# Patient Record
Sex: FEMALE | Race: WHITE | NOT HISPANIC OR LATINO | URBAN - METROPOLITAN AREA
[De-identification: names, ages, dates, MRNs, and addresses within clinical notes are randomized per-mention and may not be internally consistent; named-entity substitution may affect disease eponyms.]

---

## 2018-02-01 ENCOUNTER — INPATIENT (INPATIENT)
Facility: HOSPITAL | Age: 83
LOS: 0 days | Discharge: HOME | DRG: 194 | End: 2018-02-02
Attending: INTERNAL MEDICINE | Admitting: INTERNAL MEDICINE
Payer: MEDICARE

## 2018-02-01 VITALS
HEART RATE: 87 BPM | TEMPERATURE: 98 F | OXYGEN SATURATION: 93 % | DIASTOLIC BLOOD PRESSURE: 66 MMHG | SYSTOLIC BLOOD PRESSURE: 145 MMHG | RESPIRATION RATE: 18 BRPM

## 2018-02-01 LAB
ALBUMIN SERPL ELPH-MCNC: 4 G/DL — SIGNIFICANT CHANGE UP (ref 3.3–5)
ALP SERPL-CCNC: 62 U/L — SIGNIFICANT CHANGE UP (ref 40–120)
ALT FLD-CCNC: 19 U/L — SIGNIFICANT CHANGE UP (ref 10–45)
ANION GAP SERPL CALC-SCNC: 16 MMOL/L — SIGNIFICANT CHANGE UP (ref 5–17)
APTT BLD: 21 SEC — LOW (ref 27.5–37.4)
AST SERPL-CCNC: 22 U/L — SIGNIFICANT CHANGE UP (ref 10–40)
BASOPHILS NFR BLD AUTO: 0.3 % — SIGNIFICANT CHANGE UP (ref 0–2)
BILIRUB SERPL-MCNC: 0.3 MG/DL — SIGNIFICANT CHANGE UP (ref 0.2–1.2)
BUN SERPL-MCNC: 28 MG/DL — HIGH (ref 7–23)
CALCIUM SERPL-MCNC: 9.3 MG/DL — SIGNIFICANT CHANGE UP (ref 8.4–10.5)
CHLORIDE SERPL-SCNC: 98 MMOL/L — SIGNIFICANT CHANGE UP (ref 96–108)
CK MB CFR SERPL CALC: 2.1 NG/ML — SIGNIFICANT CHANGE UP (ref 0–6.7)
CK SERPL-CCNC: 193 U/L — HIGH (ref 25–170)
CO2 SERPL-SCNC: 22 MMOL/L — SIGNIFICANT CHANGE UP (ref 22–31)
CREAT SERPL-MCNC: 1 MG/DL — SIGNIFICANT CHANGE UP (ref 0.5–1.3)
FLUAV H1 2009 PAND RNA SPEC QL NAA+PROBE: DETECTED
GLUCOSE SERPL-MCNC: 165 MG/DL — HIGH (ref 70–99)
HCT VFR BLD CALC: 33.3 % — LOW (ref 34.5–45)
HGB BLD-MCNC: 10.8 G/DL — LOW (ref 11.5–15.5)
INR BLD: 1.11 — SIGNIFICANT CHANGE UP (ref 0.88–1.16)
LACTATE SERPL-SCNC: 3.2 MMOL/L — HIGH (ref 0.5–2)
LACTATE SERPL-SCNC: 4.1 MMOL/L — CRITICAL HIGH (ref 0.5–2)
LACTATE SERPL-SCNC: 4.9 MMOL/L — CRITICAL HIGH (ref 0.5–2)
LYMPHOCYTES # BLD AUTO: 30 % — SIGNIFICANT CHANGE UP (ref 13–44)
MCHC RBC-ENTMCNC: 29.6 PG — SIGNIFICANT CHANGE UP (ref 27–34)
MCHC RBC-ENTMCNC: 32.4 G/DL — SIGNIFICANT CHANGE UP (ref 32–36)
MCV RBC AUTO: 91.2 FL — SIGNIFICANT CHANGE UP (ref 80–100)
MONOCYTES NFR BLD AUTO: 5.1 % — SIGNIFICANT CHANGE UP (ref 2–14)
NEUTROPHILS NFR BLD AUTO: 64.6 % — SIGNIFICANT CHANGE UP (ref 43–77)
PLATELET # BLD AUTO: 168 K/UL — SIGNIFICANT CHANGE UP (ref 150–400)
POTASSIUM SERPL-MCNC: 4.8 MMOL/L — SIGNIFICANT CHANGE UP (ref 3.5–5.3)
POTASSIUM SERPL-SCNC: 4.8 MMOL/L — SIGNIFICANT CHANGE UP (ref 3.5–5.3)
PROT SERPL-MCNC: 6.5 G/DL — SIGNIFICANT CHANGE UP (ref 6–8.3)
PROTHROM AB SERPL-ACNC: 12.4 SEC — SIGNIFICANT CHANGE UP (ref 9.8–12.7)
RAPID RVP RESULT: DETECTED
RBC # BLD: 3.65 M/UL — LOW (ref 3.8–5.2)
RBC # FLD: 14 % — SIGNIFICANT CHANGE UP (ref 10.3–16.9)
SODIUM SERPL-SCNC: 136 MMOL/L — SIGNIFICANT CHANGE UP (ref 135–145)
TROPONIN T SERPL-MCNC: <0.01 NG/ML — SIGNIFICANT CHANGE UP (ref 0–0.01)
WBC # BLD: 13.4 K/UL — HIGH (ref 3.8–10.5)
WBC # FLD AUTO: 13.4 K/UL — HIGH (ref 3.8–10.5)

## 2018-02-01 PROCEDURE — 93010 ELECTROCARDIOGRAM REPORT: CPT

## 2018-02-01 PROCEDURE — 71045 X-RAY EXAM CHEST 1 VIEW: CPT | Mod: 26

## 2018-02-01 PROCEDURE — 99285 EMERGENCY DEPT VISIT HI MDM: CPT | Mod: 25

## 2018-02-01 PROCEDURE — 71275 CT ANGIOGRAPHY CHEST: CPT | Mod: 26

## 2018-02-01 RX ORDER — SODIUM CHLORIDE 9 MG/ML
1000 INJECTION INTRAMUSCULAR; INTRAVENOUS; SUBCUTANEOUS
Qty: 0 | Refills: 0 | Status: DISCONTINUED | OUTPATIENT
Start: 2018-02-01 | End: 2018-02-02

## 2018-02-01 RX ORDER — INSULIN LISPRO 100/ML
VIAL (ML) SUBCUTANEOUS
Qty: 0 | Refills: 0 | Status: DISCONTINUED | OUTPATIENT
Start: 2018-02-01 | End: 2018-02-02

## 2018-02-01 RX ORDER — SODIUM CHLORIDE 9 MG/ML
1000 INJECTION INTRAMUSCULAR; INTRAVENOUS; SUBCUTANEOUS ONCE
Qty: 0 | Refills: 0 | Status: COMPLETED | OUTPATIENT
Start: 2018-02-01 | End: 2018-02-01

## 2018-02-01 RX ORDER — DEXTROSE 50 % IN WATER 50 %
1 SYRINGE (ML) INTRAVENOUS ONCE
Qty: 0 | Refills: 0 | Status: DISCONTINUED | OUTPATIENT
Start: 2018-02-01 | End: 2018-02-02

## 2018-02-01 RX ORDER — DEXTROSE 50 % IN WATER 50 %
25 SYRINGE (ML) INTRAVENOUS ONCE
Qty: 0 | Refills: 0 | Status: DISCONTINUED | OUTPATIENT
Start: 2018-02-01 | End: 2018-02-02

## 2018-02-01 RX ORDER — IPRATROPIUM/ALBUTEROL SULFATE 18-103MCG
3 AEROSOL WITH ADAPTER (GRAM) INHALATION ONCE
Qty: 0 | Refills: 0 | Status: COMPLETED | OUTPATIENT
Start: 2018-02-01 | End: 2018-02-01

## 2018-02-01 RX ORDER — SODIUM CHLORIDE 9 MG/ML
1000 INJECTION, SOLUTION INTRAVENOUS
Qty: 0 | Refills: 0 | Status: DISCONTINUED | OUTPATIENT
Start: 2018-02-01 | End: 2018-02-02

## 2018-02-01 RX ORDER — IPRATROPIUM/ALBUTEROL SULFATE 18-103MCG
3 AEROSOL WITH ADAPTER (GRAM) INHALATION EVERY 4 HOURS
Qty: 0 | Refills: 0 | Status: DISCONTINUED | OUTPATIENT
Start: 2018-02-01 | End: 2018-02-02

## 2018-02-01 RX ORDER — GLUCAGON INJECTION, SOLUTION 0.5 MG/.1ML
1 INJECTION, SOLUTION SUBCUTANEOUS ONCE
Qty: 0 | Refills: 0 | Status: DISCONTINUED | OUTPATIENT
Start: 2018-02-01 | End: 2018-02-02

## 2018-02-01 RX ORDER — METFORMIN HYDROCHLORIDE 850 MG/1
1 TABLET ORAL
Qty: 0 | Refills: 0 | COMMUNITY

## 2018-02-01 RX ORDER — DEXTROSE 50 % IN WATER 50 %
12.5 SYRINGE (ML) INTRAVENOUS ONCE
Qty: 0 | Refills: 0 | Status: DISCONTINUED | OUTPATIENT
Start: 2018-02-01 | End: 2018-02-02

## 2018-02-01 RX ORDER — ONDANSETRON 8 MG/1
4 TABLET, FILM COATED ORAL ONCE
Qty: 0 | Refills: 0 | Status: COMPLETED | OUTPATIENT
Start: 2018-02-01 | End: 2018-02-01

## 2018-02-01 RX ADMIN — Medication 30 MILLIGRAM(S): at 19:17

## 2018-02-01 RX ADMIN — ONDANSETRON 4 MILLIGRAM(S): 8 TABLET, FILM COATED ORAL at 21:58

## 2018-02-01 RX ADMIN — Medication 3 MILLILITER(S): at 18:34

## 2018-02-01 RX ADMIN — SODIUM CHLORIDE 1000 MILLILITER(S): 9 INJECTION INTRAMUSCULAR; INTRAVENOUS; SUBCUTANEOUS at 21:57

## 2018-02-01 RX ADMIN — Medication 3 MILLILITER(S): at 23:23

## 2018-02-01 RX ADMIN — Medication 125 MILLIGRAM(S): at 18:33

## 2018-02-01 RX ADMIN — SODIUM CHLORIDE 1000 MILLILITER(S): 9 INJECTION INTRAMUSCULAR; INTRAVENOUS; SUBCUTANEOUS at 17:41

## 2018-02-01 RX ADMIN — SODIUM CHLORIDE 150 MILLILITER(S): 9 INJECTION INTRAMUSCULAR; INTRAVENOUS; SUBCUTANEOUS at 19:17

## 2018-02-01 RX ADMIN — Medication 3 MILLILITER(S): at 18:15

## 2018-02-01 RX ADMIN — SODIUM CHLORIDE 1000 MILLILITER(S): 9 INJECTION INTRAMUSCULAR; INTRAVENOUS; SUBCUTANEOUS at 18:54

## 2018-02-01 NOTE — ED PROVIDER NOTE - PROGRESS NOTE DETAILS
Neurosurgery to see and eval pt -  await CT noted lactate elevation  pt well appearing normotensive and ambulatory  will repeat  --does not correlate clinically  --no signs of DKA or N/V or diarrhea

## 2018-02-01 NOTE — ED PROVIDER NOTE - OBJECTIVE STATEMENT
83 yo 83 yo female with hx of DM HTN  bibems after brief syncopal event in a restaurant 1 hr ago  no head trauma  no abd pain no n/V  has had cough and wheezing over the past 2 days with flu like sx-- no leg swelling or calf tenderness -non smoker  no prior syncope-  no recent travel - stress test in the past 5 years was negative 81 yo female with hx of DM HTN  bibems after brief syncopal event in a restaurant 1 hr ago  no head trauma  no abd pain no n/V  has had cough and wheezing over the past 2 days with flu like sx-- no leg swelling or calf tenderness -non smoker  no prior syncope-  no [place] sob- no recent travel - stress test in the past 5 years was negative 81 yo female with hx of CLL dx 30 years ago- hx of DM on metformin- hx of HTN  bibems after brief syncopal event in a restaurant 1 hr ago  no head trauma  no abd pain no n/V  has had cough and wheezing over the past 2 days with flu like sx-- no leg swelling or calf tenderness -non smoker  no prior syncope-  no [place] sob- no recent travel - stress test in the past 2 years was negative

## 2018-02-01 NOTE — ED ADULT NURSE NOTE - OBJECTIVE STATEMENT
81 y/o female  BIBEMS s/p syncope at a restaurant. Pt states " I haven't been feeling well, I have a cough, chills and fever for the past 3 days, I haven't eaten much, so today I decided to go out and eat something and then I think I fainted, that's all I can remember".  on route EMS stated a IV line, IVF and gave pt 4 mg of Zofran d/u nausea 81 y/o female  BIBEMS s/p syncope at a restaurant. Pt states " I haven't been feeling well, I have a cough, chills and fever for the past 3 days, I haven't eaten much, so today I decided to go out and eat something and then I think I fainted, that's all I can remember".  on route EMS stated a IV line, IVF and gave pt 4 mg of Zofran d/u nausea.  Pt denies any pain at present time, EKG done, FS checked and blood sent to lab, pt felt warm to touched, temp rechecked rectally, MD Horan made aware of findings.

## 2018-02-01 NOTE — ED PROVIDER NOTE - MEDICAL DECISION MAKING DETAILS
81 yo F with syncope-- brief in setting of feeling ill cough flu like sx  no antecedant CP  no N/V or diarrhea 83 yo F with syncope-- brief in setting of feeling ill cough flu like sx  no antecedant CP-  no N/V or diarrhea  pos f;u  pos cough and wheezing - still with slight wheezing cough clinically stable 81 yo F with syncope-- brief in setting of feeling ill cough flu like sx  no antecedant CP-  no N/V or diarrhea  pos f/u  pos cough and wheezing - still with slight wheezing cough clinically stable- 83 yo F with syncope-- brief in setting of feeling ill cough flu like sx  no antecedant CP- no prior cardiac hx- no hx of CAD- likely from mild volume depletion   no N/V or diarrhea  pos influenza  pos cough and wheezing - still with slight wheezing cough clinically stable-

## 2018-02-01 NOTE — ED ADULT TRIAGE NOTE - CHIEF COMPLAINT QUOTE
Patient BIBA after syncopal episode while eating a restaurant. Patient endorses that she has been dealing with Flu-like symptoms. Given fluids and Zofran in the field with alleviation of s/s.

## 2018-02-02 VITALS
HEART RATE: 66 BPM | RESPIRATION RATE: 18 BRPM | OXYGEN SATURATION: 94 % | SYSTOLIC BLOOD PRESSURE: 147 MMHG | DIASTOLIC BLOOD PRESSURE: 67 MMHG | TEMPERATURE: 98 F

## 2018-02-02 DIAGNOSIS — R55 SYNCOPE AND COLLAPSE: ICD-10-CM

## 2018-02-02 DIAGNOSIS — C91.10 CHRONIC LYMPHOCYTIC LEUKEMIA OF B-CELL TYPE NOT HAVING ACHIEVED REMISSION: ICD-10-CM

## 2018-02-02 DIAGNOSIS — Z29.9 ENCOUNTER FOR PROPHYLACTIC MEASURES, UNSPECIFIED: ICD-10-CM

## 2018-02-02 DIAGNOSIS — E11.9 TYPE 2 DIABETES MELLITUS WITHOUT COMPLICATIONS: ICD-10-CM

## 2018-02-02 DIAGNOSIS — Z98.890 OTHER SPECIFIED POSTPROCEDURAL STATES: Chronic | ICD-10-CM

## 2018-02-02 DIAGNOSIS — R63.8 OTHER SYMPTOMS AND SIGNS CONCERNING FOOD AND FLUID INTAKE: ICD-10-CM

## 2018-02-02 DIAGNOSIS — E87.2 ACIDOSIS: ICD-10-CM

## 2018-02-02 DIAGNOSIS — J45.901 UNSPECIFIED ASTHMA WITH (ACUTE) EXACERBATION: ICD-10-CM

## 2018-02-02 DIAGNOSIS — R91.8 OTHER NONSPECIFIC ABNORMAL FINDING OF LUNG FIELD: ICD-10-CM

## 2018-02-02 DIAGNOSIS — I10 ESSENTIAL (PRIMARY) HYPERTENSION: ICD-10-CM

## 2018-02-02 DIAGNOSIS — Z98.49 CATARACT EXTRACTION STATUS, UNSPECIFIED EYE: Chronic | ICD-10-CM

## 2018-02-02 DIAGNOSIS — J10.1 INFLUENZA DUE TO OTHER IDENTIFIED INFLUENZA VIRUS WITH OTHER RESPIRATORY MANIFESTATIONS: ICD-10-CM

## 2018-02-02 LAB
ANION GAP SERPL CALC-SCNC: 17 MMOL/L — SIGNIFICANT CHANGE UP (ref 5–17)
BUN SERPL-MCNC: 22 MG/DL — SIGNIFICANT CHANGE UP (ref 7–23)
CALCIUM SERPL-MCNC: 8.5 MG/DL — SIGNIFICANT CHANGE UP (ref 8.4–10.5)
CHLORIDE SERPL-SCNC: 100 MMOL/L — SIGNIFICANT CHANGE UP (ref 96–108)
CO2 SERPL-SCNC: 19 MMOL/L — LOW (ref 22–31)
CREAT SERPL-MCNC: 0.93 MG/DL — SIGNIFICANT CHANGE UP (ref 0.5–1.3)
GLUCOSE BLDC GLUCOMTR-MCNC: 154 MG/DL — HIGH (ref 70–99)
GLUCOSE BLDC GLUCOMTR-MCNC: 200 MG/DL — HIGH (ref 70–99)
GLUCOSE BLDC GLUCOMTR-MCNC: 206 MG/DL — HIGH (ref 70–99)
GLUCOSE BLDC GLUCOMTR-MCNC: 233 MG/DL — HIGH (ref 70–99)
GLUCOSE SERPL-MCNC: 210 MG/DL — HIGH (ref 70–99)
HBA1C BLD-MCNC: 6.4 % — HIGH (ref 4–5.6)
HCT VFR BLD CALC: 31.8 % — LOW (ref 34.5–45)
HGB BLD-MCNC: 10.2 G/DL — LOW (ref 11.5–15.5)
LACTATE SERPL-SCNC: 2.8 MMOL/L — HIGH (ref 0.5–2)
LACTATE SERPL-SCNC: 3.6 MMOL/L — HIGH (ref 0.5–2)
MAGNESIUM SERPL-MCNC: 1.8 MG/DL — SIGNIFICANT CHANGE UP (ref 1.6–2.6)
MCHC RBC-ENTMCNC: 29.5 PG — SIGNIFICANT CHANGE UP (ref 27–34)
MCHC RBC-ENTMCNC: 32.1 G/DL — SIGNIFICANT CHANGE UP (ref 32–36)
MCV RBC AUTO: 91.9 FL — SIGNIFICANT CHANGE UP (ref 80–100)
PLATELET # BLD AUTO: 166 K/UL — SIGNIFICANT CHANGE UP (ref 150–400)
POTASSIUM SERPL-MCNC: 4.4 MMOL/L — SIGNIFICANT CHANGE UP (ref 3.5–5.3)
POTASSIUM SERPL-SCNC: 4.4 MMOL/L — SIGNIFICANT CHANGE UP (ref 3.5–5.3)
RBC # BLD: 3.46 M/UL — LOW (ref 3.8–5.2)
RBC # FLD: 14.3 % — SIGNIFICANT CHANGE UP (ref 10.3–16.9)
SODIUM SERPL-SCNC: 136 MMOL/L — SIGNIFICANT CHANGE UP (ref 135–145)
WBC # BLD: 12.2 K/UL — HIGH (ref 3.8–10.5)
WBC # FLD AUTO: 12.2 K/UL — HIGH (ref 3.8–10.5)

## 2018-02-02 PROCEDURE — 93005 ELECTROCARDIOGRAM TRACING: CPT

## 2018-02-02 PROCEDURE — 71275 CT ANGIOGRAPHY CHEST: CPT

## 2018-02-02 PROCEDURE — 80048 BASIC METABOLIC PNL TOTAL CA: CPT

## 2018-02-02 PROCEDURE — 71045 X-RAY EXAM CHEST 1 VIEW: CPT

## 2018-02-02 PROCEDURE — 71045 X-RAY EXAM CHEST 1 VIEW: CPT | Mod: 26

## 2018-02-02 PROCEDURE — 80053 COMPREHEN METABOLIC PANEL: CPT

## 2018-02-02 PROCEDURE — 87633 RESP VIRUS 12-25 TARGETS: CPT

## 2018-02-02 PROCEDURE — 87486 CHLMYD PNEUM DNA AMP PROBE: CPT

## 2018-02-02 PROCEDURE — 84484 ASSAY OF TROPONIN QUANT: CPT

## 2018-02-02 PROCEDURE — 82550 ASSAY OF CK (CPK): CPT

## 2018-02-02 PROCEDURE — 85025 COMPLETE CBC W/AUTO DIFF WBC: CPT

## 2018-02-02 PROCEDURE — 85610 PROTHROMBIN TIME: CPT

## 2018-02-02 PROCEDURE — 94640 AIRWAY INHALATION TREATMENT: CPT

## 2018-02-02 PROCEDURE — 83605 ASSAY OF LACTIC ACID: CPT

## 2018-02-02 PROCEDURE — 94660 CPAP INITIATION&MGMT: CPT

## 2018-02-02 PROCEDURE — 83735 ASSAY OF MAGNESIUM: CPT

## 2018-02-02 PROCEDURE — 85027 COMPLETE CBC AUTOMATED: CPT

## 2018-02-02 PROCEDURE — 87798 DETECT AGENT NOS DNA AMP: CPT

## 2018-02-02 PROCEDURE — 82553 CREATINE MB FRACTION: CPT

## 2018-02-02 PROCEDURE — 85730 THROMBOPLASTIN TIME PARTIAL: CPT

## 2018-02-02 PROCEDURE — 83036 HEMOGLOBIN GLYCOSYLATED A1C: CPT

## 2018-02-02 PROCEDURE — 87581 M.PNEUMON DNA AMP PROBE: CPT

## 2018-02-02 PROCEDURE — 87040 BLOOD CULTURE FOR BACTERIA: CPT

## 2018-02-02 PROCEDURE — 96375 TX/PRO/DX INJ NEW DRUG ADDON: CPT

## 2018-02-02 PROCEDURE — 96374 THER/PROPH/DIAG INJ IV PUSH: CPT

## 2018-02-02 PROCEDURE — 36415 COLL VENOUS BLD VENIPUNCTURE: CPT

## 2018-02-02 PROCEDURE — 82962 GLUCOSE BLOOD TEST: CPT

## 2018-02-02 PROCEDURE — 99285 EMERGENCY DEPT VISIT HI MDM: CPT | Mod: 25

## 2018-02-02 RX ORDER — ASPIRIN/CALCIUM CARB/MAGNESIUM 324 MG
81 TABLET ORAL DAILY
Qty: 0 | Refills: 0 | Status: DISCONTINUED | OUTPATIENT
Start: 2018-02-02 | End: 2018-02-02

## 2018-02-02 RX ORDER — DEXTROSE 50 % IN WATER 50 %
1 SYRINGE (ML) INTRAVENOUS ONCE
Qty: 0 | Refills: 0 | Status: DISCONTINUED | OUTPATIENT
Start: 2018-02-02 | End: 2018-02-02

## 2018-02-02 RX ORDER — QUINAPRIL HYDROCHLORIDE 40 MG/1
1 TABLET, FILM COATED ORAL
Qty: 0 | Refills: 0 | COMMUNITY

## 2018-02-02 RX ORDER — ASPIRIN/CALCIUM CARB/MAGNESIUM 324 MG
0 TABLET ORAL
Qty: 0 | Refills: 0 | COMMUNITY

## 2018-02-02 RX ORDER — DEXTROSE 50 % IN WATER 50 %
25 SYRINGE (ML) INTRAVENOUS ONCE
Qty: 0 | Refills: 0 | Status: DISCONTINUED | OUTPATIENT
Start: 2018-02-02 | End: 2018-02-02

## 2018-02-02 RX ORDER — METFORMIN HYDROCHLORIDE 850 MG/1
2 TABLET ORAL
Qty: 0 | Refills: 0 | COMMUNITY

## 2018-02-02 RX ORDER — ALLOPURINOL 300 MG
1 TABLET ORAL
Qty: 0 | Refills: 0 | COMMUNITY

## 2018-02-02 RX ORDER — SODIUM CHLORIDE 9 MG/ML
1000 INJECTION INTRAMUSCULAR; INTRAVENOUS; SUBCUTANEOUS
Qty: 0 | Refills: 0 | Status: DISCONTINUED | OUTPATIENT
Start: 2018-02-02 | End: 2018-02-02

## 2018-02-02 RX ORDER — METFORMIN HYDROCHLORIDE 850 MG/1
1 TABLET ORAL
Qty: 0 | Refills: 0 | COMMUNITY

## 2018-02-02 RX ORDER — ACETAMINOPHEN 500 MG
650 TABLET ORAL EVERY 6 HOURS
Qty: 0 | Refills: 0 | Status: DISCONTINUED | OUTPATIENT
Start: 2018-02-02 | End: 2018-02-02

## 2018-02-02 RX ORDER — MAGNESIUM SULFATE 500 MG/ML
1 VIAL (ML) INJECTION ONCE
Qty: 0 | Refills: 0 | Status: COMPLETED | OUTPATIENT
Start: 2018-02-02 | End: 2018-02-02

## 2018-02-02 RX ORDER — IPRATROPIUM/ALBUTEROL SULFATE 18-103MCG
3 AEROSOL WITH ADAPTER (GRAM) INHALATION EVERY 4 HOURS
Qty: 0 | Refills: 0 | Status: DISCONTINUED | OUTPATIENT
Start: 2018-02-02 | End: 2018-02-02

## 2018-02-02 RX ORDER — GLUCAGON INJECTION, SOLUTION 0.5 MG/.1ML
1 INJECTION, SOLUTION SUBCUTANEOUS ONCE
Qty: 0 | Refills: 0 | Status: DISCONTINUED | OUTPATIENT
Start: 2018-02-02 | End: 2018-02-02

## 2018-02-02 RX ORDER — TRIAMTERENE/HYDROCHLOROTHIAZID 75 MG-50MG
1 TABLET ORAL
Qty: 0 | Refills: 0 | COMMUNITY

## 2018-02-02 RX ORDER — DEXTROSE 50 % IN WATER 50 %
12.5 SYRINGE (ML) INTRAVENOUS ONCE
Qty: 0 | Refills: 0 | Status: DISCONTINUED | OUTPATIENT
Start: 2018-02-02 | End: 2018-02-02

## 2018-02-02 RX ORDER — INSULIN LISPRO 100/ML
VIAL (ML) SUBCUTANEOUS
Qty: 0 | Refills: 0 | Status: DISCONTINUED | OUTPATIENT
Start: 2018-02-02 | End: 2018-02-02

## 2018-02-02 RX ORDER — ALLOPURINOL 300 MG
0 TABLET ORAL
Qty: 0 | Refills: 0 | COMMUNITY

## 2018-02-02 RX ORDER — SODIUM CHLORIDE 9 MG/ML
1000 INJECTION, SOLUTION INTRAVENOUS
Qty: 0 | Refills: 0 | Status: DISCONTINUED | OUTPATIENT
Start: 2018-02-02 | End: 2018-02-02

## 2018-02-02 RX ORDER — CIPROFLOXACIN LACTATE 400MG/40ML
1 VIAL (ML) INTRAVENOUS
Qty: 2 | Refills: 0 | OUTPATIENT
Start: 2018-02-02 | End: 2018-02-05

## 2018-02-02 RX ORDER — ALBUTEROL 90 UG/1
0 AEROSOL, METERED ORAL
Qty: 0 | Refills: 0 | COMMUNITY

## 2018-02-02 RX ADMIN — Medication 30 MILLIGRAM(S): at 17:20

## 2018-02-02 RX ADMIN — Medication 4: at 06:39

## 2018-02-02 RX ADMIN — Medication 100 GRAM(S): at 09:08

## 2018-02-02 RX ADMIN — Medication 2: at 17:16

## 2018-02-02 RX ADMIN — Medication 4: at 12:02

## 2018-02-02 RX ADMIN — Medication 3 MILLILITER(S): at 02:24

## 2018-02-02 RX ADMIN — Medication 81 MILLIGRAM(S): at 12:02

## 2018-02-02 RX ADMIN — Medication 3 MILLILITER(S): at 04:48

## 2018-02-02 RX ADMIN — Medication 2: at 03:07

## 2018-02-02 RX ADMIN — Medication 30 MILLIGRAM(S): at 07:22

## 2018-02-02 RX ADMIN — Medication 3 MILLILITER(S): at 09:07

## 2018-02-02 NOTE — DISCHARGE NOTE ADULT - HOSPITAL COURSE
81 yo F w/ PMHx of DM, HTN BIBEMS after witnessed syncopal episode in a coffee shop. She reports having a cough and wheezing x3 days. She was managing it with Ventolin and Tylenol. Her PO intake has been low lately and she has been feeling malaise. She went to the coffee shop in order to get fresh air and a meal, however while sitting at the table, she felt dizzy and fainted. She does not recall how long she lost consciousness for. She reports waking up to the barista offering her juice. She was not told if she had any upper or lower extremity shaking but, she denies urinary incontinence. She admits to nausea and subjective chills for the past few days. Denies sore throat, runny nose, diarrhea, or blurry vision. No previous hospitalizations, but does report she had "walking PNA" last year. In ED, VS: T-98, HR-99, B.P-101/51, RR-17, o2 sat 92% on RA. Patient had CTPE which was negative for pulmonary embolism, but did show subpleural ground glass opacities, interlobular septal thickening, peribronchial thickening, and trace R pleural effusion c/w pulmonary edema. Also showed mild thoracic lymphadenopathy.   Patient found to be Influenza B+, started on Tamiflu and given Levaquin and Solu-medrol. Labs notable for elevated lactate to 3.4, which progressively went up to 4.9 even after fluid resuscitation however patient non toxic in appearance, mentating well with stable vital signs. Patient started on maintenance fluids with lactate downtrending to 2.8. Case reviewed with Dr. Lara, patient appears clinically and hemodynamically stable for discharge with PO medications and continued outpatient follow up with her PMD. 81 yo F w/ PMHx of DM, HTN BIBEMS after witnessed syncopal episode in a coffee shop. She reports having a cough and wheezing x3 days. She was managing it with Ventolin and Tylenol. Her PO intake has been low lately and she has been feeling malaise. She went to the coffee shop in order to get fresh air and a meal, however while sitting at the table, she felt dizzy and fainted. She does not recall how long she lost consciousness for. She reports waking up to the barista offering her juice. She was not told if she had any upper or lower extremity shaking but, she denies urinary incontinence. She admits to nausea and subjective chills for the past few days. Denies sore throat, runny nose, diarrhea, or blurry vision. No previous hospitalizations, but does report she had "walking PNA" last year. In ED, VS: T-98, HR-99, B.P-101/51, RR-17, o2 sat 92% on RA and orthostatics positive. Patient had CTPE which was negative for pulmonary embolism, but did show subpleural ground glass opacities, interlobular septal thickening, peribronchial thickening, and trace R pleural effusion c/w pulmonary edema. Also showed mild thoracic lymphadenopathy.   Patient found to be Influenza B+, started on Tamiflu and given Levaquin and Solu-medrol. Labs notable for elevated lactate to 3.4, which progressively went up to 4.9 even after fluid resuscitation however patient non toxic in appearance, mentating well with stable vital signs. Patient started on maintenance fluids with lactate downtrending to 2.8. Case reviewed with Dr. Lara, patient appears clinically and hemodynamically stable for discharge with PO medications and continued outpatient follow up with her PMD. 81 yo F w/ PMHx of DM, HTN BIBEMS after witnessed syncopal episode in a coffee shop. She reports having a cough and wheezing x3 days. She was managing it with Ventolin and Tylenol. Her PO intake has been low lately and she has been feeling malaise. She went to the coffee shop in order to get fresh air and a meal, however while sitting at the table, she felt dizzy and fainted. She does not recall how long she lost consciousness for. She reports waking up to the barista offering her juice. She was not told if she had any upper or lower extremity shaking but, she denies urinary incontinence. She admits to nausea and subjective chills for the past few days. Denies sore throat, runny nose, diarrhea, or blurry vision. No previous hospitalizations, but does report she had "walking PNA" last year. In ED, VS: T-98, HR-99, B.P-101/51, RR-17, o2 sat 92% on RA and orthostatics positive. Patient had CTPE which was negative for pulmonary embolism, but did show subpleural ground glass opacities, interlobular septal thickening, peribronchial thickening, and trace R pleural effusion c/w pulmonary edema. Also showed mild thoracic lymphadenopathy.   Patient found to be Influenza B+, started on Tamiflu and given Levaquin and Solu-medrol. Labs notable for elevated lactate to 3.4, which progressively went up to 4.9 even after fluid resuscitation however patient non toxic in appearance, mentating well with stable vital signs. Patient started on maintenance fluids with lactate downtrending to 2.8 which was thought to be 2/2 home metformin. Case reviewed with Dr. Lara, patient appears clinically and hemodynamically stable for discharge with PO medications and continued outpatient follow up with her PMD. 83 yo F w/ PMHx of DM, HTN BIBEMS after witnessed syncopal episode in a coffee shop. She reports having a cough and wheezing x3 days. She was managing it with Ventolin and Tylenol. Her PO intake has been low lately and she has been feeling malaise. She went to the coffee shop in order to get fresh air and a meal, however while sitting at the table, she felt dizzy and fainted. She does not recall how long she lost consciousness for. She reports waking up to the barista offering her juice. She was not told if she had any upper or lower extremity shaking but, she denies urinary incontinence. She admits to nausea and subjective chills for the past few days. Denies sore throat, runny nose, diarrhea, or blurry vision. No previous hospitalizations, but does report she had "walking PNA" last year. In ED, VS: T-98, HR-99, B.P-101/51, RR-17, o2 sat 92% on RA and orthostatics positive. Patient had CTPE which was negative for pulmonary embolism, but did show subpleural ground glass opacities, interlobular septal thickening, peribronchial thickening, and trace R pleural effusion c/w pulmonary edema. Also showed mild thoracic lymphadenopathy.   Patient found to be Influenza B+, started on Tamiflu and given Levaquin and Solu-medrol. Labs notable for elevated lactate to 3.4, which progressively went up to 4.9 even after fluid resuscitation however patient non toxic in appearance, mentating well with stable vital signs. Patient started on maintenance fluids with lactate downtrending to 2.8 which was thought to be 2/2 home metformin and albuterol given. Case reviewed with Dr. aLra, patient appears clinically and hemodynamically stable for discharge with PO medications and continued outpatient follow up with her PMD.

## 2018-02-02 NOTE — H&P ADULT - PROBLEM SELECTOR PLAN 1
Patient with witnessed syncopal episode, unclear how long it lasted. However pt was sitting at a coffeeshop and LOC suddenly, no preceding aura, cp, palpitations. When she regained consciousness, she recalls someone offering her juice and then EMS was called.  -Likely in setting of dehydration and influenza. Pt orthostatics positive in ED.   -EKG NSR at 88, no hx of cardiac disease or arrythmia   -C/w IVF NS at 150cc/hr Patient with witnessed syncopal episode, unclear how long it lasted. However pt was sitting at a coffee shop and LOC suddenly, no preceding aura, cp, palpitations. When she regained consciousness, she recalls someone offering her juice and then EMS was called.  -Likely in setting of dehydration and influenza. Pt orthostatics positive in ED.   -EKG NSR at 88, no hx of cardiac disease or arrythmia   -C/w IVF NS at 150cc/hr

## 2018-02-02 NOTE — H&P ADULT - ASSESSMENT
82F w/PMH of DM, HTN, CLL (30 years) BIBEMS after witnessed syncopal episode, admitted for Influenza and lactic acidosis.

## 2018-02-02 NOTE — H&P ADULT - PROBLEM SELECTOR PLAN 3
Patient w/elevated lactate to 3.4 in ED, did not clear after 2L NS bolus. Patient not hypotensive and wwp in all extremities, non toxic appearing.   -Elevated lactate likely type B due to Metformin use. Pt reports taking Metformin ER 500mg TID as prescribed by her PMD. CTPE: negative for pulmonary embolism, but did show subpleural ground glass opacities, interlobular septal thickening, peribronchial thickening, and trace R pleural effusion c/w pulmonary edema. Also showed mild thoracic lymphadenopathy.   -Unclear etiology, possible PNA or related to CLL?   -s/p Levaquin in ED, consider continuing abx if pt still with productive cough and not improving on tamiflu.

## 2018-02-02 NOTE — H&P ADULT - PROBLEM SELECTOR PLAN 4
Patient takes Metformin ER 500mg TID.  -Hold PO meds  -MDSS and FS Patient w/elevated lactate to 3.4 in ED, did not clear after 2L NS bolus. Patient not hypotensive and wwp in all extremities, non toxic appearing.   -Elevated lactate likely type B due to Metformin use. Pt reports taking Metformin ER 500mg TID as prescribed by her PMD. Patient w/elevated lactate to 3.4 in ED, did not clear after 2L NS bolus. Patient not hypotensive and wwp in all extremities, non toxic appearing.   -Elevated lactate likely type B due to Metformin use. Pt reports taking Metformin ER 500mg TID as prescribed by her PMD.  -f/u AM lactate

## 2018-02-02 NOTE — ED ADULT NURSE REASSESSMENT NOTE - NS ED NURSE REASSESS COMMENT FT1
Lactate elevated, inpatient team informed.  NO interventions at this time besides IVF hydration as per team.

## 2018-02-02 NOTE — PROGRESS NOTE ADULT - SUBJECTIVE AND OBJECTIVE BOX
[facility] IM ATTENDING    81 yo  retired  was visiting [place]. She came back on Tuesday and immediately became sick.  Yesterday she went to a coffee shop and collapsed. 911 was called and the ambulance brought her to the ER.   Found to have wheezing and lactic acidosis and influenza.  Last year walking pneumonia.  Chronic cough for years.    PAST MEDICAL & SURGICAL HISTORY:  CLL (chronic lymphocytic leukemia)  DM (diabetes mellitus)  HTN (hypertension)  History of cataract surgery  H/O benign breast biopsy  H/O abdominal hysterectomy    FAMILY HISTORY:  fa  lung cancer  mo  89, CHF    SOCIAL HISTORY:  lives with ; no tobacco    REVIEW OF SYSTEMS:  Constitutional: (+) weight change, (-) fever,  (+) chills, (-) fatigue, (-) night sweats  Eyes: (-) discharge, () eye pain, () visual change  ENT:  (-) hearing difficulty, () vertigo, () sinus pain,  () throat pain, () epistaxis, () dysphagia, () hoarseness  Neck: (-) pain, () stiffness, () swelling  Respiratory: (+) cough, (+) wheezing, (-) hemoptysis      Cardiovascular: (-) chest pain, (-)palpitations, () dizziness   Gastrointestinal: (-) abdominal pain, (-) nausea, (-) vomiting, () hematemesis, (-) diarrhea,  (-) constipation, () melena  Genitourinary:  (-) dysuria, () frequency, (-) hematuria, () incontinence      Neurologic: (-) headache, (+) memory loss, () loss of strength, () numbness, () tremor     Skin: (-) itching, (-) burning, (-) rash, () lesions   Lymphatic: () enlarged lymph nodes  Endocrine: () hair loss, () temperature intolerance         Musculoskeletal: () back pain, () joint pain,  () extremity pain  Psychiatric: (-) visual change, () auditory change, () depression, () anxiety, () suicidal  Sleep: (-) disorder, (-) insomnia, (-) sleep deprivation  Heme/Lymph: () easy bruising, () bleeding gums            Allergy and Immunologic: () hives, () eczema    Vital Signs Last 24 Hrs  T(C): 36.6 (02 Feb 2018 12:31), Max: 37.8 (01 Feb 2018 17:58)  T(F): 97.8 (02 Feb 2018 12:31), Max: 100 (01 Feb 2018 17:58)  HR: 76 (02 Feb 2018 12:31) (70 - 99)  BP: 140/67 (02 Feb 2018 12:31) (101/51 - 145/66)  BP(mean): --  RR: 18 (02 Feb 2018 12:31) (17 - 20)  SpO2: 97% (02 Feb 2018 12:31) (91% - 99%)    I&O's Detail    01 Feb 2018 07:01  -  02 Feb 2018 07:00  --------------------------------------------------------  IN:    Sodium Chloride 0.9% IV Bolus: 1000 mL  Total IN: 1000 mL    OUT:  Total OUT: 0 mL    Total NET: 1000 mL    PHYSICAL EXAM:  in bed, with son  Well nourished, well developed, comfortable, - acute distress; vital signs are monitored continuously  Eyes: PERRLA, EOMI, -conjunctivitis, -scleritis   Head: no focal deficit, normocephalic,  no trauma  ENMT: moist tongue, no thrush, -nasal discharge, -hoarseness, normal hearing, -cough, -hemoptysis, trachea midline  Neck: supple, - lymphadenopathy,  -masses, -JVD  Respiratory: bilateral diminished breath sounds, + EXP wheezing, ++ rhonchi, +rales, -crackles  Chest: -accessory muscle use, -paradoxical breathing  Cardiovascular: regular rate and sinus rhythm, S1 S2 normal, -S3, -S4, -murmur, -gallop, -rub  Gastrointestinal: soft, nontender, nondistended, normal bowel sounds, no hepatosplenomegaly  Genitourinary: -flank pain, -dysuria  Extremities: -clubbing, -cyanosis, -edema    Vascular: peripheral pulses palpable 2+ bilaterally  Neurological: alert, oriented x 3, no focal deficit, -tremor   Skin: warm, dry, -erythema, iv site intact  Lymph nodes; no cervical, supraclavicular or axillary adenopathy  Psychiatric: cooperative, appropriate mood    MEDICATIONS  (STANDING):  aspirin enteric coated 81 milliGRAM(s) Oral daily  dextrose 5%. 1000 milliLiter(s) (50 mL/Hr) IV Continuous <Continuous>  dextrose 50% Injectable 12.5 Gram(s) IV Push once  dextrose 50% Injectable 25 Gram(s) IV Push once  dextrose 50% Injectable 25 Gram(s) IV Push once  insulin lispro (HumaLOG) corrective regimen sliding scale   SubCutaneous Before meals and at bedtime  oseltamivir 30 milliGRAM(s) Oral every 12 hours  sodium chloride 0.9%. 1000 milliLiter(s) (100 mL/Hr) IV Continuous <Continuous>    MEDICATIONS  (PRN):  acetaminophen   Tablet 650 milliGRAM(s) Oral every 6 hours PRN For Temp greater than 38 C (100.4 F)  ALBUTerol/ipratropium for Nebulization 3 milliLiter(s) Nebulizer every 4 hours PRN Shortness of Breath and/or Wheezing  dextrose Gel 1 Dose(s) Oral once PRN Blood Glucose LESS THAN 70 milliGRAM(s)/deciliter  glucagon  Injectable 1 milliGRAM(s) IntraMuscular once PRN Glucose LESS THAN 70 milligrams/deciliter      Allergies    Norvasc (Swelling)  penicillin (Hives)    Intolerances      LABS:                        10.2   12.2  )-----------( 166      ( 02 Feb 2018 06:34 )             31.8     02-02    136  |  100  |  22  ----------------------------<  210<H>  4.4   |  19<L>  |  0.93    Ca    8.5      02 Feb 2018 06:34  Mg     1.8     02-02    TPro  6.5  /  Alb  4.0  /  TBili  0.3  /  DBili  x   /  AST  22  /  ALT  19  /  AlkPhos  62  02-01    PT/INR - ( 01 Feb 2018 17:30 )   PT: 12.4 sec;   INR: 1.11     PTT - ( 01 Feb 2018 17:30 )  PTT:21.0 sec    +DVT prophylaxis DISCHARGE  +Sleep  I DID NOT SLEEP  +Nutrition goals ORAL  -Pain  DENIED  -Decubital ulcer  +GI prophylaxis (PPV, coagulopathy, Hx)  +Aspiration prophylaxis (45 degrees)  +Sedation/analgesia stopped once  +ID (phos, CH, mupi, SB)  -Delirium  +Cardiac /ASA/  +Prevention  +Education  +Medication reviewed (drug-drug interactions, PDA)  Medical devices  Discussed with ER MD, PGY, CCRN, son    RADIOLOGY & ADDITIONAL STUDIES:    INTERPRETATION:  CTA (CT angiography) ofthe CHEST dated 2/1/2018 9:42 PM    INDICATION: Syncope and shortness of breath. Assess for pulmonary   embolism.    TECHNIQUE: CT angiography of the chest was performed during bolus   injection of intravenous contrast.  Post-processing including the  production of axial, coronal and sagittal multiplanar reformatted images   and axial and coronal maximum intensity projections (MIPs) was performed.    COMPARISON: None.    FINDINGS:    Lower neck: Visualized thyroid unremarkable. No lower cervical  lymphadenopathy.    Vascular: No pulmonary embolism. Main pulmonary artery is normal in   caliber. No aortic aneurysm or dissection. Mild aortic arch   calcifications.    Mediastinum: Normal heart size. No pericardial effusion. Mild aortic   valve calcifications.    Lungs/Pleura/Airways: Scattered areas of subpleural groundglass   opacities, greater in the right lower lobe. Bilateral interlobular septal   thickening and peribronchial thickening. Trace right pleural effusion.   Patent central airways.    Lymph nodes: Mediastinal, bilateral hilar, and bilateral axillary   lymphadenopathy. For example, a subcarinal lymph node measures 1.7 cm in   short axis; a right hilar lymph node measures 1.1 cm; a left axillary   lymph node measures 1.7 cm.    Upper abdomen: Ill-defined 3 cm hypodensity within the spleen is likely   related to early arterial phase of imaging.    Bones/Soft tissues: Mild degenerative changes of the thoracic spine.   Moderate degenerative changes of the visualized cervical spine.      IMPRESSION:     No pulmonary embolism.    Subpleural groundglass opacities, interlobular septal thickening,   peribronchial thickening, and trace right pleural effusion. These   findings are most consistent with pulmonary edema.    Mildthoracic lymphadenopathy of indeterminate etiology.

## 2018-02-02 NOTE — DISCHARGE NOTE ADULT - CARE PLAN
Principal Discharge DX:	Syncope  Secondary Diagnosis:	Influenza A Principal Discharge DX:	Syncope  Goal:	To prevent further symptoms.  Assessment and plan of treatment:	You presented with to the hospital after a syncopal episode which was likely secondary to dehydration and your flu. You were given fluids while in the hospital with resolution of your symptoms.   While in the hospital, your vital signs remained stable. You are stable to be discharged with outpatient follow up with your PMD.  Secondary Diagnosis:	Influenza A  Assessment and plan of treatment:	You presented with cough and wheezing for 3 days, found to be influenza positive for which Tamiflu was started. Please continue to take Tamiflu  Please continue to take Levaquin 750 mg every 48 hours for total 5 days.  Please follow up with your PMD upon discharge.  Secondary Diagnosis:	DM (diabetes mellitus)  Assessment and plan of treatment:	Please continue to take your home medications and follow up with your PMD upon discharge.  Secondary Diagnosis:	HTN (hypertension)  Assessment and plan of treatment:	Please continue to take your home medications and follow up with your PMD upon discharge.  Secondary Diagnosis:	CLL (chronic lymphocytic leukemia)  Assessment and plan of treatment:	Please continue to take your home medications and follow up with your PMD upon discharge. Principal Discharge DX:	Syncope  Goal:	To prevent further symptoms.  Assessment and plan of treatment:	You presented with to the hospital after a syncopal episode which was likely secondary to dehydration and your flu. You were given fluids while in the hospital with resolution of your symptoms.   While in the hospital, your vital signs remained stable. You are stable to be discharged with outpatient follow up with your PMD.  Secondary Diagnosis:	Influenza A  Assessment and plan of treatment:	You presented with cough and wheezing for 3 days, found to be influenza positive for which Tamiflu was started. Please continue to take Tamiflu 30 mg every 12 hours for another 4 days.    Please follow up with your PMD upon discharge.  Secondary Diagnosis:	DM (diabetes mellitus)  Assessment and plan of treatment:	Please continue to take your home medications and follow up with your PMD upon discharge.  Secondary Diagnosis:	HTN (hypertension)  Assessment and plan of treatment:	Please continue to take your home medications and follow up with your PMD upon discharge.  Secondary Diagnosis:	CLL (chronic lymphocytic leukemia)  Assessment and plan of treatment:	Please continue to take your home medications and follow up with your PMD upon discharge.  Secondary Diagnosis:	Lactic acidosis  Assessment and plan of treatment:	You had an elevated lactate upon admission for which you received fluids however your lactate increased initially after which you received more fluids. Your repeat lactate had now decreased however is still elevated but it could be secondary to the metformin you were taking and also the nebulizers could contribute to increased levels.  Your vital signs have remained stable since admission. Please follow up with your PMD for continued management regarding medication adjustments and further work up. Principal Discharge DX:	Syncope  Goal:	To prevent further symptoms.  Assessment and plan of treatment:	You presented with to the hospital after a syncopal episode which was likely secondary to dehydration and your flu. You were given fluids while in the hospital with resolution of your symptoms.   While in the hospital, your vital signs remained stable. You are stable to be discharged with outpatient follow up with your PMD.  Secondary Diagnosis:	Influenza A  Assessment and plan of treatment:	You presented with cough and wheezing for 3 days, found to be influenza positive for which Tamiflu was started. Please continue to take Tamiflu 30 mg every 12 hours for another 4 days.  Please follow up with your PMD upon discharge.  Secondary Diagnosis:	DM (diabetes mellitus)  Assessment and plan of treatment:	Please continue to take your home medications and follow up with your PMD upon discharge.  Secondary Diagnosis:	HTN (hypertension)  Assessment and plan of treatment:	Please continue to take your home medications and follow up with your PMD upon discharge.  Secondary Diagnosis:	CLL (chronic lymphocytic leukemia)  Assessment and plan of treatment:	Please continue to take your home medications and follow up with your PMD upon discharge.  Secondary Diagnosis:	Lactic acidosis  Assessment and plan of treatment:	You had an elevated lactate upon admission for which you received fluids however your lactate increased initially after which you received more fluids. Your repeat lactate had now decreased however is still elevated but it could be secondary to the metformin you were taking and also the nebulizers could contribute to increased levels.  Your vital signs have remained stable since admission. Please follow up with your PMD for continued management regarding medication adjustments and further work up.

## 2018-02-02 NOTE — DISCHARGE NOTE ADULT - MEDICATION SUMMARY - MEDICATIONS TO TAKE
I will START or STAY ON the medications listed below when I get home from the hospital:    aspirin 81 mg oral delayed release tablet  -- Indication: For Prophylactic measure    quinapril 40 mg oral tablet  -- 1 tab(s) by mouth once a day  -- Indication: For Hypertension    metFORMIN 500 mg oral tablet, extended release  -- 1 tab(s) by mouth 3 times a day  -- Indication: For Diabetes Mellitus    allopurinol 300 mg oral tablet  -- 1 tab(s) by mouth once a day  -- Indication: For Gout    pravastatin 40 mg oral tablet  -- 1 tab(s) by mouth once a day  -- Indication: For Hyperlipidemia    triamterene-hydrochlorothiazide 37.5 mg-25 mg oral tablet  -- 1 tab(s) by mouth once a day  -- Indication: For Hypertension    oseltamivir 30 mg oral capsule  -- 1 cap(s) by mouth every 12 hours  -- Indication: For Influenza A    levoFLOXacin 750 mg oral tablet  -- 1 tab(s) by mouth every 48 hours  -- Indication: For Pneumonia    Caltrate 600 + D oral tablet  -- 1 tab(s) by mouth once a day  -- Indication: For Nutrition, metabolism, and development symptoms I will START or STAY ON the medications listed below when I get home from the hospital:    aspirin 81 mg oral delayed release tablet  -- Indication: For Prophylactic measure    quinapril 40 mg oral tablet  -- 1 tab(s) by mouth once a day  -- Indication: For Hypertension    metFORMIN 500 mg oral tablet, extended release  -- 1 tab(s) by mouth 3 times a day  -- Indication: For Diabetes Mellitus    allopurinol 300 mg oral tablet  -- 1 tab(s) by mouth once a day  -- Indication: For Gout    pravastatin 40 mg oral tablet  -- 1 tab(s) by mouth once a day  -- Indication: For Hyperlipidemia    triamterene-hydrochlorothiazide 37.5 mg-25 mg oral tablet  -- 1 tab(s) by mouth once a day  -- Indication: For Hypertension    oseltamivir 30 mg oral capsule  -- 1 cap(s) by mouth every 12 hours  -- Indication: For Influenza A    Caltrate 600 + D oral tablet  -- 1 tab(s) by mouth once a day  -- Indication: For Nutrition, metabolism, and development symptoms

## 2018-02-02 NOTE — H&P ADULT - PROBLEM SELECTOR PLAN 8
HSQ    Dispo: Admit to [facility] for IV fluids and supportive treatment Diabetic diet  Replete electrolytes  IVF as above

## 2018-02-02 NOTE — DISCHARGE NOTE ADULT - CARE PROVIDER_API CALL
Lico Lara), Critical Care Medicine; Internal Medicine; Pulmonary Disease; Sleep Medicine  [address] [id number]  Phone: [phone]  Fax: [phone]

## 2018-02-02 NOTE — H&P ADULT - HISTORY OF PRESENT ILLNESS
82F w/PMH of DM, HTN, CLL (30 years) BIBEMS after witnessed syncopal episode in a coffee shop.       In ED, patient had CTPE which was negative for pulmonary embolism. Patient found to be Influenza B+, started on Tamiflu. Labs notable for elevated lactate to 3.4, which progressively went up to 4.9 even after fluid resuscitation. 82F w/PMH of DM, HTN, CLL (30 years) BIBEMS after witnessed syncopal episode in a coffee shop. She reports having a cough and wheezing x3 days. She was managing it with Ventolin and Tylenol. Her PO intake has been low lately and she has been feeling malaise. She went to the coffee shop in order to get fresh air and a meal, however while sitting at the table, she felt dizzy and fainted. She does not recall how long she lost consciousness for. She reports waking up to the  offering her juice. She was not told if she had any upper or lower extremity shaking but, she denies urinary incontinence. She admits to nausea and subjective chills for the past few days. Denies sore throat, runny nose, diarrhea, or blurry vision. No previous hospitalizations, but does reports she had "walking PNA" last year.     In ED, patient had CTPE which was negative for pulmonary embolism. Patient found to be Influenza B+, started on Tamiflu. Labs notable for elevated lactate to 3.4, which progressively went up to 4.9 even after fluid resuscitation. 82F w/PMH of DM, HTN, CLL (30 years) BIBEMS after witnessed syncopal episode in a coffee shop. She reports having a cough and wheezing x3 days. She was managing it with Ventolin and Tylenol. Her PO intake has been low lately and she has been feeling malaise. She went to the coffee shop in order to get fresh air and a meal, however while sitting at the table, she felt dizzy and fainted. She does not recall how long she lost consciousness for. She reports waking up to the  offering her juice. She was not told if she had any upper or lower extremity shaking but, she denies urinary incontinence. She admits to nausea and subjective chills for the past few days. Denies sore throat, runny nose, diarrhea, or blurry vision. No previous hospitalizations, but does report she had "walking PNA" last year.     In ED, patient had CTPE which was negative for pulmonary embolism. Patient found to be Influenza B+, started on Tamiflu. Labs notable for elevated lactate to 3.4, which progressively went up to 4.9 even after fluid resuscitation. 82F w/PMH of DM, HTN, CLL (30 years) BIBEMS after witnessed syncopal episode in a coffee shop. She reports having a cough and wheezing x3 days. She was managing it with Ventolin and Tylenol. Her PO intake has been low lately and she has been feeling malaise. She went to the coffee shop in order to get fresh air and a meal, however while sitting at the table, she felt dizzy and fainted. She does not recall how long she lost consciousness for. She reports waking up to the barista offering her juice. She was not told if she had any upper or lower extremity shaking but, she denies urinary incontinence. She admits to nausea and subjective chills for the past few days. Denies sore throat, runny nose, diarrhea, or blurry vision. No previous hospitalizations, but does report she had "walking PNA" last year.     In ED, patient had CTPE which was negative for pulmonary embolism, but did show subpleural groundglass opacities, interlobular septal thickening, peribronchial thickening, and trace R pleural effusion c/w pulmonary edema. Also showed mild thoracic lymphadenopathy.   Patient found to be Influenza B+, started on Tamiflu and given Levaquin and Solu-medrol. Labs notable for elevated lactate to 3.4, which progressively went up to 4.9 even after fluid resuscitation.

## 2018-02-02 NOTE — H&P ADULT - PROBLEM SELECTOR PLAN 5
Normotensive currenlty. Pt with positive orthostatics in ED.  -Hold antihypertensives (Quinapril 40mg and Triamterene/HCTZ 37.5/25mg) Patient takes Metformin ER 500mg TID.  -Hold PO meds  -MDSS and FS

## 2018-02-02 NOTE — H&P ADULT - PROBLEM SELECTOR PLAN 9
HSQ    Dispo: Admit to [facility] for IV fluids and supportive treatment

## 2018-02-02 NOTE — H&P ADULT - PROBLEM/PLAN-7
Alert-The patient is alert, awake and responds to voice. The patient is oriented to time, place, and person. The triage nurse is able to obtain subjective information.
DISPLAY PLAN FREE TEXT

## 2018-02-02 NOTE — DISCHARGE NOTE ADULT - PLAN OF CARE
To prevent further symptoms. You presented with to the hospital after a syncopal episode which was likely secondary to dehydration and your flu. You were given fluids while in the hospital with resolution of your symptoms.   While in the hospital, your vital signs remained stable. You are stable to be discharged with outpatient follow up with your PMD. You presented with cough and wheezing for 3 days, found to be influenza positive for which Tamiflu was started. Please continue to take Tamiflu  Please continue to take Levaquin 750 mg every 48 hours for total 5 days.  Please follow up with your PMD upon discharge. Please continue to take your home medications and follow up with your PMD upon discharge. You presented with cough and wheezing for 3 days, found to be influenza positive for which Tamiflu was started. Please continue to take Tamiflu 30 mg every 12 hours for another 4 days.    Please follow up with your PMD upon discharge. You had an elevated lactate upon admission for which you received fluids however your lactate increased initially after which you received more fluids. Your repeat lactate had now decreased however is still elevated but it could be secondary to the metformin you were taking and also the nebulizers could contribute to increased levels.  Your vital signs have remained stable since admission. Please follow up with your PMD for continued management regarding medication adjustments and further work up. You presented with cough and wheezing for 3 days, found to be influenza positive for which Tamiflu was started. Please continue to take Tamiflu 30 mg every 12 hours for another 4 days.  Please follow up with your PMD upon discharge.

## 2018-02-02 NOTE — DISCHARGE NOTE ADULT - PATIENT PORTAL LINK FT
“You can access the FollowHealth Patient Portal, offered by [facility], by registering with the following website: [email or web address]”

## 2018-02-02 NOTE — H&P ADULT - PROBLEM SELECTOR PLAN 2
Patient found to be Influenza AH3 positive in ED.  -C/w Tamiflu 30mg BID (renally dosed) Patient found to be Influenza AH3 positive in ED.  -C/w Tamiflu 30mg BID (renally dosed)  -Supportive care

## 2018-02-02 NOTE — DISCHARGE NOTE ADULT - SECONDARY DIAGNOSIS.
Influenza A DM (diabetes mellitus) HTN (hypertension) CLL (chronic lymphocytic leukemia) Lactic acidosis

## 2018-02-02 NOTE — H&P ADULT - PROBLEM SELECTOR PLAN 6
Patient reports being diagnosed with CLL about 30 years ago, always has been stable. She does not know her baseline WBC count, however she dose get annual followup with Dr. Mike. Normotensive currenlty. Pt with positive orthostatics in ED.  -Hold antihypertensives (Quinapril 40mg and Triamterene/HCTZ 37.5/25mg) Normotensive currently. Pt with positive orthostatics in ED.  -Hold antihypertensives (Quinapril 40mg and Triamterene/HCTZ 37.5/25mg)  -Restart as needed or upon discharge

## 2018-02-02 NOTE — H&P ADULT - PROBLEM SELECTOR PLAN 7
Diabetic diet  Replete electrolytes  IVF as above Patient reports being diagnosed with CLL about 30 years ago, always has been stable. She does not know her baseline WBC count, however she dose get annual followup with Dr. Mike.

## 2018-02-02 NOTE — H&P ADULT - NSHPPHYSICALEXAM_GEN_ALL_CORE
.  VITAL SIGNS:  T(C): 36.7 (02-01-18 @ 19:35), Max: 37.8 (02-01-18 @ 17:58)  T(F): 98 (02-01-18 @ 19:35), Max: 100 (02-01-18 @ 17:58)  HR: 90 (02-01-18 @ 23:38) (87 - 99)  BP: 135/68 (02-01-18 @ 23:38) (101/51 - 145/66)  BP(mean): --  RR: 17 (02-01-18 @ 23:38) (17 - 18)  SpO2: 94% (02-01-18 @ 23:38) (92% - 94%)  Wt(kg): --    PHYSICAL EXAM:    Constitutional: looks fatigued, WDWN resting comfortably in bed; NAD  Head: NC/AT  Eyes: PERRL, EOMI, clear conjunctiva  ENT: no nasal discharge; uvula midline, no oropharyngeal erythema or exudates; dry MM, no maxillary or frontal tenderness   Neck: supple  Respiratory: diffuse expiratory wheezing, good air movement, no rhonchi or rales heard   Cardiac: +S1/S2; RRR; no M/R/G  Gastrointestinal: soft, NT/ND; no rebound or guarding; +BSx4  Back: spine midline, no bony tenderness or step-offs; no CVAT B/L  Extremities: WWP, no clubbing or cyanosis; no peripheral edema  Musculoskeletal: NROM x4; no joint swelling, tenderness or erythema  Vascular: 2+ radial and DP intact   Dermatologic: skin warm, dry and intact; no rashes, wounds, or scars  Lymphatic: no submandibular or cervical LAD  Neurologic: AAOx3; CNII-XII grossly intact; no focal deficits  Psychiatric: affect and characteristics of appearance, verbalizations, behaviors are appropriate .  VITAL SIGNS:  T(C): 36.7 (02-01-18 @ 19:35), Max: 37.8 (02-01-18 @ 17:58)  T(F): 98 (02-01-18 @ 19:35), Max: 100 (02-01-18 @ 17:58)  HR: 90 (02-01-18 @ 23:38) (87 - 99)  BP: 135/68 (02-01-18 @ 23:38) (101/51 - 145/66)  BP(mean): --  RR: 17 (02-01-18 @ 23:38) (17 - 18)  SpO2: 94% (02-01-18 @ 23:38) (92% - 94%)  Wt(kg): --    PHYSICAL EXAM:    Constitutional: looks fatigued, WDWN resting comfortably in bed; NAD  Head: NC/AT  Eyes: PERRL, EOMI, clear conjunctiva  ENT: no nasal discharge; uvula midline, no oropharyngeal erythema or exudates; dry MM, no maxillary or frontal tenderness   Neck: supple  Respiratory: diffuse expiratory wheezing, good air movement, no rhonchi or rales heard, no accessory muscle use   Cardiac: +S1/S2; RRR; no M/R/G  Gastrointestinal: soft, NT/ND; no rebound or guarding; +BSx4  Back: spine midline, no bony tenderness or step-offs; no CVAT B/L  Extremities: WWP, no clubbing or cyanosis; no peripheral edema  Musculoskeletal: NROM x4; no joint swelling, tenderness or erythema  Vascular: 2+ radial and DP intact   Dermatologic: skin warm, dry and intact; no rashes, wounds, or scars  Lymphatic: no submandibular or cervical LAD  Neurologic: AAOx3; CNII-XII grossly intact; no focal deficits  Psychiatric: affect and characteristics of appearance, verbalizations, behaviors are appropriate

## 2018-02-02 NOTE — H&P ADULT - NSHPLABSRESULTS_GEN_ALL_CORE
.  LABS:                         10.8   13.4  )-----------( 168      ( 01 Feb 2018 17:30 )             33.3     02-01    136  |  98  |  28<H>  ----------------------------<  165<H>  4.8   |  22  |  1.00    Ca    9.3      01 Feb 2018 17:30    TPro  6.5  /  Alb  4.0  /  TBili  0.3  /  DBili  x   /  AST  22  /  ALT  19  /  AlkPhos  62  02-01    PT/INR - ( 01 Feb 2018 17:30 )   PT: 12.4 sec;   INR: 1.11          PTT - ( 01 Feb 2018 17:30 )  PTT:21.0 sec    CARDIAC MARKERS ( 01 Feb 2018 17:30 )  x     / <0.01 ng/mL / 193 U/L / x     / 2.1 ng/mL        Lactate, Blood: 4.9 mmoL/L (02-01 @ 21:58)  Lactate, Blood: 4.1 mmoL/L (02-01 @ 21:17)  Lactate, Blood: 3.2 mmoL/L (02-01 @ 18:43)      RADIOLOGY, EKG & ADDITIONAL TESTS: Reviewed.   CXR: no discrete infiltrates     CT Angio Chest PE Protocol w/ IV Cont (02.01.18 @ 21:42)   IMPRESSION:   No pulmonary embolism.    Subpleural groundglass opacities, interlobular septal thickening,   peribronchial thickening, and trace right pleural effusion. These   findings are most consistent with pulmonary edema.    Mildthoracic lymphadenopathy of indeterminate etiology.      KARO VEGA M.D., ATTENDING RADIOLOGIST  This document has been electronically signed. Feb 1 201810:19PM

## 2018-02-06 DIAGNOSIS — C91.10 CHRONIC LYMPHOCYTIC LEUKEMIA OF B-CELL TYPE NOT HAVING ACHIEVED REMISSION: ICD-10-CM

## 2018-02-06 DIAGNOSIS — Z79.82 LONG TERM (CURRENT) USE OF ASPIRIN: ICD-10-CM

## 2018-02-06 DIAGNOSIS — R55 SYNCOPE AND COLLAPSE: ICD-10-CM

## 2018-02-06 DIAGNOSIS — E86.0 DEHYDRATION: ICD-10-CM

## 2018-02-06 DIAGNOSIS — Z79.84 LONG TERM (CURRENT) USE OF ORAL HYPOGLYCEMIC DRUGS: ICD-10-CM

## 2018-02-06 DIAGNOSIS — E87.2 ACIDOSIS: ICD-10-CM

## 2018-02-06 DIAGNOSIS — T38.3X5A ADVERSE EFFECT OF INSULIN AND ORAL HYPOGLYCEMIC [ANTIDIABETIC] DRUGS, INITIAL ENCOUNTER: ICD-10-CM

## 2018-02-06 DIAGNOSIS — I10 ESSENTIAL (PRIMARY) HYPERTENSION: ICD-10-CM

## 2018-02-06 DIAGNOSIS — Z80.1 FAMILY HISTORY OF MALIGNANT NEOPLASM OF TRACHEA, BRONCHUS AND LUNG: ICD-10-CM

## 2018-02-06 DIAGNOSIS — Z88.0 ALLERGY STATUS TO PENICILLIN: ICD-10-CM

## 2018-02-06 DIAGNOSIS — Z90.710 ACQUIRED ABSENCE OF BOTH CERVIX AND UTERUS: ICD-10-CM

## 2018-02-06 DIAGNOSIS — J45.901 UNSPECIFIED ASTHMA WITH (ACUTE) EXACERBATION: ICD-10-CM

## 2018-02-06 DIAGNOSIS — J10.1 INFLUENZA DUE TO OTHER IDENTIFIED INFLUENZA VIRUS WITH OTHER RESPIRATORY MANIFESTATIONS: ICD-10-CM

## 2018-02-06 DIAGNOSIS — E11.9 TYPE 2 DIABETES MELLITUS WITHOUT COMPLICATIONS: ICD-10-CM

## 2018-02-06 LAB
CULTURE RESULTS: SIGNIFICANT CHANGE UP
CULTURE RESULTS: SIGNIFICANT CHANGE UP
SPECIMEN SOURCE: SIGNIFICANT CHANGE UP
SPECIMEN SOURCE: SIGNIFICANT CHANGE UP

## 2024-07-10 ENCOUNTER — EMERGENCY (EMERGENCY)
Facility: HOSPITAL | Age: 89
LOS: 1 days | Discharge: HOME | End: 2024-07-10
Attending: EMERGENCY MEDICINE | Admitting: EMERGENCY MEDICINE
Payer: MEDICARE

## 2024-07-10 VITALS
DIASTOLIC BLOOD PRESSURE: 77 MMHG | RESPIRATION RATE: 18 BRPM | TEMPERATURE: 98 F | HEIGHT: 65 IN | HEART RATE: 79 BPM | WEIGHT: 149.91 LBS | SYSTOLIC BLOOD PRESSURE: 155 MMHG | OXYGEN SATURATION: 96 %

## 2024-07-10 DIAGNOSIS — R51.9 HEADACHE, UNSPECIFIED: ICD-10-CM

## 2024-07-10 DIAGNOSIS — M10.9 GOUT, UNSPECIFIED: ICD-10-CM

## 2024-07-10 DIAGNOSIS — Z23 ENCOUNTER FOR IMMUNIZATION: ICD-10-CM

## 2024-07-10 DIAGNOSIS — Z79.82 LONG TERM (CURRENT) USE OF ASPIRIN: ICD-10-CM

## 2024-07-10 DIAGNOSIS — S52.501A UNSPECIFIED FRACTURE OF THE LOWER END OF RIGHT RADIUS, INITIAL ENCOUNTER FOR CLOSED FRACTURE: ICD-10-CM

## 2024-07-10 DIAGNOSIS — S60.416A ABRASION OF RIGHT LITTLE FINGER, INITIAL ENCOUNTER: ICD-10-CM

## 2024-07-10 DIAGNOSIS — Z88.0 ALLERGY STATUS TO PENICILLIN: ICD-10-CM

## 2024-07-10 DIAGNOSIS — S02.31XA FRACTURE OF ORBITAL FLOOR, RIGHT SIDE, INITIAL ENCOUNTER FOR CLOSED FRACTURE: ICD-10-CM

## 2024-07-10 DIAGNOSIS — Z98.49 CATARACT EXTRACTION STATUS, UNSPECIFIED EYE: Chronic | ICD-10-CM
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
## 2024-07-10 DIAGNOSIS — J45.909 UNSPECIFIED ASTHMA, UNCOMPLICATED: ICD-10-CM

## 2024-07-10 DIAGNOSIS — I10 ESSENTIAL (PRIMARY) HYPERTENSION: ICD-10-CM
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
## 2024-07-10 DIAGNOSIS — E11.9 TYPE 2 DIABETES MELLITUS WITHOUT COMPLICATIONS: ICD-10-CM
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
## 2024-07-10 DIAGNOSIS — Z98.890 OTHER SPECIFIED POSTPROCEDURAL STATES: Chronic | ICD-10-CM

## 2024-07-10 DIAGNOSIS — S06.6XAA TRAUMATIC SUBARACHNOID HEMORRHAGE WITH LOSS OF CONSCIOUSNESS STATUS UNKNOWN, INITIAL ENCOUNTER: ICD-10-CM

## 2024-07-10 DIAGNOSIS — E78.5 HYPERLIPIDEMIA, UNSPECIFIED: ICD-10-CM

## 2024-07-10 DIAGNOSIS — Z88.8 ALLERGY STATUS TO OTHER DRUGS, MEDICAMENTS AND BIOLOGICAL SUBSTANCES: ICD-10-CM

## 2024-07-10 DIAGNOSIS — Z79.84 LONG TERM (CURRENT) USE OF ORAL HYPOGLYCEMIC DRUGS: ICD-10-CM

## 2024-07-10 PROBLEM — C91.10 CHRONIC LYMPHOCYTIC LEUKEMIA OF B-CELL TYPE NOT HAVING ACHIEVED REMISSION: Chronic | Status: ACTIVE | Noted: 2018-02-02

## 2024-07-10 LAB
ALBUMIN SERPL ELPH-MCNC: 4.1 G/DL — SIGNIFICANT CHANGE UP (ref 3.3–5)
ALP SERPL-CCNC: 76 U/L — SIGNIFICANT CHANGE UP (ref 40–120)
ALT FLD-CCNC: 19 U/L — SIGNIFICANT CHANGE UP (ref 10–45)
ANION GAP SERPL CALC-SCNC: 13 MMOL/L — SIGNIFICANT CHANGE UP (ref 5–17)
ANISOCYTOSIS BLD QL: SLIGHT — SIGNIFICANT CHANGE UP
APTT BLD: 25.8 SEC — SIGNIFICANT CHANGE UP (ref 24.5–35.6)
AST SERPL-CCNC: 23 U/L — SIGNIFICANT CHANGE UP (ref 10–40)
BASOPHILS # BLD AUTO: 0 K/UL — SIGNIFICANT CHANGE UP (ref 0–0.2)
BASOPHILS NFR BLD AUTO: 0 % — SIGNIFICANT CHANGE UP (ref 0–2)
BILIRUB SERPL-MCNC: 0.2 MG/DL — SIGNIFICANT CHANGE UP (ref 0.2–1.2)
BLD GP AB SCN SERPL QL: NEGATIVE — SIGNIFICANT CHANGE UP
BUN SERPL-MCNC: 32 MG/DL — HIGH (ref 7–23)
CALCIUM SERPL-MCNC: 10.4 MG/DL — SIGNIFICANT CHANGE UP (ref 8.4–10.5)
CHLORIDE SERPL-SCNC: 104 MMOL/L — SIGNIFICANT CHANGE UP (ref 96–108)
CO2 SERPL-SCNC: 23 MMOL/L — SIGNIFICANT CHANGE UP (ref 22–31)
CREAT SERPL-MCNC: 1.1 MG/DL — SIGNIFICANT CHANGE UP (ref 0.5–1.3)
EGFR: 48 ML/MIN/1.73M2 — LOW
EGFR: 48 ML/MIN/1.73M2 — LOW
EOSINOPHIL # BLD AUTO: 0.34 K/UL — SIGNIFICANT CHANGE UP (ref 0–0.5)
EOSINOPHIL NFR BLD AUTO: 2.1 % — SIGNIFICANT CHANGE UP (ref 0–6)
GLUCOSE SERPL-MCNC: 159 MG/DL — HIGH (ref 70–99)
HCT VFR BLD CALC: 38.7 % — SIGNIFICANT CHANGE UP (ref 34.5–45)
HGB BLD-MCNC: 12.5 G/DL — SIGNIFICANT CHANGE UP (ref 11.5–15.5)
INR BLD: 0.83 — LOW (ref 0.85–1.18)
LYMPHOCYTES # BLD AUTO: 32.6 % — SIGNIFICANT CHANGE UP (ref 13–44)
LYMPHOCYTES # BLD AUTO: 5.22 K/UL — HIGH (ref 1–3.3)
MACROCYTES BLD QL: SLIGHT — SIGNIFICANT CHANGE UP
MANUAL SMEAR VERIFICATION: SIGNIFICANT CHANGE UP
MCHC RBC-ENTMCNC: 30.9 PG — SIGNIFICANT CHANGE UP (ref 27–34)
MCHC RBC-ENTMCNC: 32.3 GM/DL — SIGNIFICANT CHANGE UP (ref 32–36)
MCV RBC AUTO: 95.8 FL — SIGNIFICANT CHANGE UP (ref 80–100)
MICROCYTES BLD QL: SLIGHT — SIGNIFICANT CHANGE UP
MONOCYTES # BLD AUTO: 0.51 K/UL — SIGNIFICANT CHANGE UP (ref 0–0.9)
MONOCYTES NFR BLD AUTO: 3.2 % — SIGNIFICANT CHANGE UP (ref 2–14)
NEUTROPHILS # BLD AUTO: 9.77 K/UL — HIGH (ref 1.8–7.4)
NEUTROPHILS NFR BLD AUTO: 61 % — SIGNIFICANT CHANGE UP (ref 43–77)
OVALOCYTES BLD QL SMEAR: SLIGHT — SIGNIFICANT CHANGE UP
PLAT MORPH BLD: NORMAL — SIGNIFICANT CHANGE UP
PLATELET # BLD AUTO: 242 K/UL — SIGNIFICANT CHANGE UP (ref 150–400)
POTASSIUM SERPL-MCNC: 4.2 MMOL/L — SIGNIFICANT CHANGE UP (ref 3.5–5.3)
POTASSIUM SERPL-SCNC: 4.2 MMOL/L — SIGNIFICANT CHANGE UP (ref 3.5–5.3)
PROT SERPL-MCNC: 6.7 G/DL — SIGNIFICANT CHANGE UP (ref 6–8.3)
PROTHROM AB SERPL-ACNC: 9.5 SEC — SIGNIFICANT CHANGE UP (ref 9.5–13)
RBC # BLD: 4.04 M/UL — SIGNIFICANT CHANGE UP (ref 3.8–5.2)
RBC # FLD: 14.2 % — SIGNIFICANT CHANGE UP (ref 10.3–14.5)
RBC BLD AUTO: ABNORMAL
RH IG SCN BLD-IMP: POSITIVE — SIGNIFICANT CHANGE UP
SMUDGE CELLS # BLD: PRESENT — SIGNIFICANT CHANGE UP
SODIUM SERPL-SCNC: 140 MMOL/L — SIGNIFICANT CHANGE UP (ref 135–145)
SPHEROCYTES BLD QL SMEAR: SLIGHT — SIGNIFICANT CHANGE UP
VARIANT LYMPHS # BLD: 1.1 % — SIGNIFICANT CHANGE UP (ref 0–6)
VARIANT LYMPHS NFR BLD MANUAL: 1.1 % — SIGNIFICANT CHANGE UP (ref 0–6)
WBC # BLD: 16.02 K/UL — HIGH (ref 3.8–10.5)
WBC # FLD AUTO: 16.02 K/UL — HIGH (ref 3.8–10.5)

## 2024-07-10 PROCEDURE — 70486 CT MAXILLOFACIAL W/O DYE: CPT | Mod: 26,MC

## 2024-07-10 PROCEDURE — 73110 X-RAY EXAM OF WRIST: CPT | Mod: 26,RT

## 2024-07-10 PROCEDURE — 70450 CT HEAD/BRAIN W/O DYE: CPT | Mod: 26,MC

## 2024-07-10 PROCEDURE — 99283 EMERGENCY DEPT VISIT LOW MDM: CPT

## 2024-07-10 PROCEDURE — 70450 CT HEAD/BRAIN W/O DYE: CPT | Mod: 26,MC,77

## 2024-07-10 PROCEDURE — 99285 EMERGENCY DEPT VISIT HI MDM: CPT | Mod: FS

## 2024-07-10 RX ORDER — ACETAMINOPHEN 500 MG/5ML
500 LIQUID (ML) ORAL ONCE
Refills: 0 | Status: COMPLETED | OUTPATIENT
Start: 2024-07-10 | End: 2024-07-10

## 2024-07-10 RX ORDER — CLINDAMYCIN PHOSPHATE 150 MG/ML
1 VIAL (ML) INJECTION
Qty: 28 | Refills: 0
Start: 2024-07-10 | End: 2024-07-16

## 2024-07-10 RX ORDER — CLINDAMYCIN PHOSPHATE 150 MG/ML
300 VIAL (ML) INJECTION ONCE
Refills: 0 | Status: COMPLETED | OUTPATIENT
Start: 2024-07-10 | End: 2024-07-10

## 2024-07-10 RX ADMIN — Medication 500 MILLIGRAM(S): at 19:31

## 2024-07-10 RX ADMIN — Medication 300 MILLIGRAM(S): at 22:31

## 2024-07-10 RX ADMIN — Medication 500 MILLIGRAM(S): at 23:36

## 2024-07-11 ENCOUNTER — APPOINTMENT (OUTPATIENT)
Dept: NEUROSURGERY | Facility: CLINIC | Age: 89
End: 2024-07-11
Payer: MEDICARE

## 2024-07-11 VITALS
OXYGEN SATURATION: 95 % | DIASTOLIC BLOOD PRESSURE: 76 MMHG | SYSTOLIC BLOOD PRESSURE: 166 MMHG | RESPIRATION RATE: 18 BRPM | HEART RATE: 65 BPM | TEMPERATURE: 97 F

## 2024-07-11 PROBLEM — Z00.00 ENCOUNTER FOR PREVENTIVE HEALTH EXAMINATION: Status: ACTIVE | Noted: 2024-07-11

## 2024-07-11 PROCEDURE — 85610 PROTHROMBIN TIME: CPT

## 2024-07-11 PROCEDURE — 70486 CT MAXILLOFACIAL W/O DYE: CPT | Mod: MC

## 2024-07-11 PROCEDURE — 85730 THROMBOPLASTIN TIME PARTIAL: CPT

## 2024-07-11 PROCEDURE — 90471 IMMUNIZATION ADMIN: CPT

## 2024-07-11 PROCEDURE — 36415 COLL VENOUS BLD VENIPUNCTURE: CPT

## 2024-07-11 PROCEDURE — 86900 BLOOD TYPING SEROLOGIC ABO: CPT

## 2024-07-11 PROCEDURE — 29125 APPL SHORT ARM SPLINT STATIC: CPT | Mod: RT

## 2024-07-11 PROCEDURE — 73110 X-RAY EXAM OF WRIST: CPT | Mod: 26,RT

## 2024-07-11 PROCEDURE — 86850 RBC ANTIBODY SCREEN: CPT

## 2024-07-11 PROCEDURE — 90715 TDAP VACCINE 7 YRS/> IM: CPT

## 2024-07-11 PROCEDURE — 86901 BLOOD TYPING SEROLOGIC RH(D): CPT

## 2024-07-11 PROCEDURE — 80053 COMPREHEN METABOLIC PANEL: CPT

## 2024-07-11 PROCEDURE — 70450 CT HEAD/BRAIN W/O DYE: CPT | Mod: MC

## 2024-07-11 PROCEDURE — 85025 COMPLETE CBC W/AUTO DIFF WBC: CPT

## 2024-07-11 PROCEDURE — 99441: CPT | Mod: 93

## 2024-07-11 PROCEDURE — 73110 X-RAY EXAM OF WRIST: CPT

## 2024-07-11 PROCEDURE — 99284 EMERGENCY DEPT VISIT MOD MDM: CPT | Mod: 25

## 2024-07-11 RX ORDER — ALLOPURINOL 200 MG/1
TABLET ORAL
Refills: 0 | Status: ACTIVE | COMMUNITY

## 2024-07-11 RX ORDER — CLINDAMYCIN HYDROCHLORIDE 300 MG/1
300 CAPSULE ORAL
Refills: 0 | Status: ACTIVE | COMMUNITY

## 2024-07-11 RX ORDER — METFORMIN HYDROCHLORIDE 625 MG/1
TABLET ORAL
Refills: 0 | Status: ACTIVE | COMMUNITY

## 2024-07-11 RX ORDER — OSELTAMIVIR PHOSPHATE 45 MG/1
CAPSULE ORAL
Refills: 0 | Status: ACTIVE | COMMUNITY

## 2024-07-11 RX ORDER — TRIAMTERENE AND HYDROCHLOROTHIAZIDE 50; 75 MG/1; MG/1
TABLET ORAL
Refills: 0 | Status: ACTIVE | COMMUNITY

## 2024-07-11 RX ORDER — QUINAPRIL HYDROCHLORIDE 5 MG/1
TABLET, FILM COATED ORAL
Refills: 0 | Status: ACTIVE | COMMUNITY

## 2024-07-11 RX ORDER — PRAVASTATIN SODIUM 80 MG/1
TABLET ORAL
Refills: 0 | Status: ACTIVE | COMMUNITY

## 2024-07-11 RX ADMIN — Medication 500 MILLIGRAM(S): at 00:00

## 2024-07-15 ENCOUNTER — TRANSCRIPTION ENCOUNTER (OUTPATIENT)
Age: 89
End: 2024-07-15

## 2024-07-17 PROBLEM — S06.6X0D SUBARACHNOID HEMORRHAGE FOLLOWING INJURY, NO LOSS OF CONSCIOUSNESS, SUBSEQUENT ENCOUNTER: Status: ACTIVE | Noted: 2024-07-11

## 2024-07-17 PROBLEM — Z91.81 STATUS POST FALL: Status: ACTIVE | Noted: 2024-07-11

## 2024-07-24 ENCOUNTER — APPOINTMENT (OUTPATIENT)
Dept: NEUROSURGERY | Facility: CLINIC | Age: 89
End: 2024-07-24
Payer: MEDICARE

## 2024-07-24 ENCOUNTER — APPOINTMENT (OUTPATIENT)
Dept: CT IMAGING | Facility: HOSPITAL | Age: 89
End: 2024-07-24

## 2024-07-24 VITALS
BODY MASS INDEX: 28.35 KG/M2 | HEIGHT: 63 IN | SYSTOLIC BLOOD PRESSURE: 151 MMHG | DIASTOLIC BLOOD PRESSURE: 76 MMHG | OXYGEN SATURATION: 96 % | WEIGHT: 160 LBS | HEART RATE: 90 BPM

## 2024-07-24 DIAGNOSIS — Z91.81 HISTORY OF FALLING: ICD-10-CM

## 2024-07-24 DIAGNOSIS — S06.6X0D TRAUMATIC SUBARACHNOID HEMORRHAGE W/OUT LOSS OF CONSCIOUSNESS, SUBSEQUENT ENCOUNTER: ICD-10-CM

## 2024-07-24 PROCEDURE — 99202 OFFICE O/P NEW SF 15 MIN: CPT

## 2024-07-25 NOTE — HISTORY OF PRESENT ILLNESS
[FreeTextEntry1] : 88 y/o female with PMHx of HTN, HLD, asthma, gout (on allopurinol), DM (on metformin), BL UE familial tremor presented to [facility] ER 7/10/24 s/p mechanical trip and fall when chasing her hat that flew away in the wind and suffered head strike to R frontal/orbital region, no LOC. Pt was with her aide at the time, and normally ambulates independently or with the use of a cane. CTH in ER showed minimal SAH in Right frontal and parietal regions, as well as Right orbital fracture. Also R distal radius fracture. Wrist was splinted. SAH stable on repeat imaging. Dc from ER with outpatient f/u with rec to hold cardioprotective aspirin 81 mg and on Clindamycin 300 mg PO TID x 7 days for proyphylaxis, considering maxillary sinus involvement.  Presents today for post ER follow- up. Repeated CTH today.  Denies ongoing headaches, dizziness, weakness, seizure like activities, other new/worsening focal neuro deficits.  Denies ear/ nasal drainage.  Periorbital facial swelling and ecchymosis improving.  Has been following with ortho for right wrist.   Sonia Song  [phone] [address] [facility] [address] [id number], [place] [address]

## 2024-07-25 NOTE — HISTORY OF PRESENT ILLNESS
[FreeTextEntry1] : 88 y/o female with PMHx of HTN, HLD, asthma, gout (on allopurinol), DM (on metformin), BL UE familial tremor presented to [facility] ER 7/10/24 s/p mechanical trip and fall when chasing her hat that flew away in the wind and suffered head strike to R frontal/orbital region, no LOC. Pt was with her aide at the time, and normally ambulates independently or with the use of a cane. CTH in ER showed minimal SAH in Right frontal and parietal regions, as well as Right orbital fracture. Also R distal radius fracture. Wrist was splinted. SAH stable on repeat imaging. Dc from ER with outpatient f/u with rec to hold cardioprotective aspirin 81 mg and on Clindamycin 300 mg PO TID x 7 days for proyphylaxis, considering maxillary sinus involvement.  Presents today for post ER follow- up. Repeated CTH today.  Denies ongoing headaches, dizziness, weakness, seizure like activities, other new/worsening focal neuro deficits.  Denies ear/ nasal drainage.  Periorbital facial swelling and ecchymosis improving.  Has been following with ortho for right wrist.   Sonia Song  [phone] [address] [facility] [address] [address], [place] [address]

## 2024-07-25 NOTE — PHYSICAL EXAM
[General Appearance - Alert] : alert [General Appearance - In No Acute Distress] : in no acute distress [Oriented To Time, Place, And Person] : oriented to person, place, and time [Impaired Insight] : insight and judgment were intact [Affect] : the affect was normal [Memory Recent] : recent memory was not impaired [Sclera] : the sclera and conjunctiva were normal [Neck Appearance] : the appearance of the neck was normal [] : no respiratory distress [Respiration, Rhythm And Depth] : normal respiratory rhythm and effort [Abnormal Walk] : normal gait [Skin Color & Pigmentation] : normal skin color and pigmentation [FreeTextEntry5] : CN II-XII grossly intact  [FreeTextEntry1] : right periorbital swelling and ecchymosis

## 2024-07-25 NOTE — REASON FOR VISIT
[Post ER] : a post ER visit [Family Member] : family member [FreeTextEntry1] : SAH after mechanical trip and fall

## 2024-07-25 NOTE — ASSESSMENT
[FreeTextEntry1] : 89F s/p traumatic SAH presents for 2 week follow up. CT with resolution. No further imaging necessary.   Dr. D'Amico independently reviewed all available images with patient.   PLAN: - Can resume aspirin 81 mg - Continue f/u with ortho for right wrist management - F/u with PCP for comprehensive care - RTC prn    Patient verbalizes understanding of today's discussion and next steps in treatment plan.     A total of 15 minutes was spent reviewing the labs, imaging and physical examination of the patient. We discussed the diagnosis, and the plan. The patient's questions were answered. The patient demonstrated an excellent understanding of the plan.

## 2024-07-25 NOTE — HISTORY OF PRESENT ILLNESS
[FreeTextEntry1] : 89 y/o female with PMHx of HTN, HLD, asthma, gout (on allopurinol), DM (on metformin), BL UE familial tremor presented to [facility] ER 7/10/24 s/p mechanical trip and fall when chasing her hat that flew away in the wind and suffered head strike to R frontal/orbital region, no LOC. Pt was with her aide at the time, and normally ambulates independently or with the use of a cane. CTH in ER showed minimal SAH in Right frontal and parietal regions, as well as Right orbital fracture. Also R distal radius fracture. Wrist was splinted. SAH stable on repeat imaging. Dc from ER with outpatient f/u with rec to hold cardioprotective aspirin 81 mg and on Clindamycin 300 mg PO TID x 7 days for proyphylaxis, considering maxillary sinus involvement.  Presents today for post ER follow- up. Repeated CTH today.  Denies ongoing headaches, dizziness, weakness, seizure like activities, other new/worsening focal neuro deficits.  Denies ear/ nasal drainage.  Periorbital facial swelling and ecchymosis improving.  Has been following with ortho for right wrist.   Sonia Song  [phone] [id number] [facility] [address] [address], [place] [address]